# Patient Record
Sex: FEMALE | Race: WHITE | NOT HISPANIC OR LATINO
[De-identification: names, ages, dates, MRNs, and addresses within clinical notes are randomized per-mention and may not be internally consistent; named-entity substitution may affect disease eponyms.]

---

## 2023-10-08 ENCOUNTER — TRANSCRIPTION ENCOUNTER (OUTPATIENT)
Age: 88
End: 2023-10-08

## 2023-10-08 ENCOUNTER — INPATIENT (INPATIENT)
Facility: HOSPITAL | Age: 88
LOS: 2 days | Discharge: NURSING, REHAB OR LONG-TERM CARE | DRG: 535 | End: 2023-10-11
Attending: STUDENT IN AN ORGANIZED HEALTH CARE EDUCATION/TRAINING PROGRAM | Admitting: HOSPITALIST
Payer: MEDICARE

## 2023-10-08 VITALS
HEIGHT: 64 IN | OXYGEN SATURATION: 97 % | HEART RATE: 68 BPM | SYSTOLIC BLOOD PRESSURE: 121 MMHG | TEMPERATURE: 99 F | WEIGHT: 110.01 LBS | RESPIRATION RATE: 20 BRPM | DIASTOLIC BLOOD PRESSURE: 57 MMHG

## 2023-10-08 DIAGNOSIS — S32.82XA MULTIPLE FRACTURES OF PELVIS WITHOUT DISRUPTION OF PELVIC RING, INITIAL ENCOUNTER FOR CLOSED FRACTURE: ICD-10-CM

## 2023-10-08 LAB
ALBUMIN SERPL ELPH-MCNC: 3.9 G/DL — SIGNIFICANT CHANGE UP (ref 3.3–5.2)
ALP SERPL-CCNC: 185 U/L — HIGH (ref 40–120)
ALT FLD-CCNC: 76 U/L — HIGH
ANION GAP SERPL CALC-SCNC: 15 MMOL/L — SIGNIFICANT CHANGE UP (ref 5–17)
APPEARANCE UR: CLEAR — SIGNIFICANT CHANGE UP
AST SERPL-CCNC: 52 U/L — HIGH
BACTERIA # UR AUTO: ABNORMAL
BASOPHILS # BLD AUTO: 0.04 K/UL — SIGNIFICANT CHANGE UP (ref 0–0.2)
BASOPHILS NFR BLD AUTO: 0.3 % — SIGNIFICANT CHANGE UP (ref 0–2)
BILIRUB SERPL-MCNC: 0.4 MG/DL — SIGNIFICANT CHANGE UP (ref 0.4–2)
BILIRUB UR-MCNC: NEGATIVE — SIGNIFICANT CHANGE UP
BUN SERPL-MCNC: 52 MG/DL — HIGH (ref 8–20)
CALCIUM SERPL-MCNC: 8.5 MG/DL — SIGNIFICANT CHANGE UP (ref 8.4–10.5)
CHLORIDE SERPL-SCNC: 108 MMOL/L — SIGNIFICANT CHANGE UP (ref 96–108)
CK SERPL-CCNC: 104 U/L — SIGNIFICANT CHANGE UP (ref 25–170)
CO2 SERPL-SCNC: 15 MMOL/L — LOW (ref 22–29)
COLOR SPEC: YELLOW — SIGNIFICANT CHANGE UP
CREAT SERPL-MCNC: 2.22 MG/DL — HIGH (ref 0.5–1.3)
DIFF PNL FLD: ABNORMAL
EGFR: 20 ML/MIN/1.73M2 — LOW
EOSINOPHIL # BLD AUTO: 0.03 K/UL — SIGNIFICANT CHANGE UP (ref 0–0.5)
EOSINOPHIL NFR BLD AUTO: 0.2 % — SIGNIFICANT CHANGE UP (ref 0–6)
EPI CELLS # UR: NEGATIVE — SIGNIFICANT CHANGE UP
GLUCOSE SERPL-MCNC: 119 MG/DL — HIGH (ref 70–99)
GLUCOSE UR QL: NEGATIVE MG/DL — SIGNIFICANT CHANGE UP
HCT VFR BLD CALC: 29.8 % — LOW (ref 34.5–45)
HGB BLD-MCNC: 9.5 G/DL — LOW (ref 11.5–15.5)
IMM GRANULOCYTES NFR BLD AUTO: 0.7 % — SIGNIFICANT CHANGE UP (ref 0–0.9)
KETONES UR-MCNC: NEGATIVE — SIGNIFICANT CHANGE UP
LEUKOCYTE ESTERASE UR-ACNC: ABNORMAL
LYMPHOCYTES # BLD AUTO: 0.59 K/UL — LOW (ref 1–3.3)
LYMPHOCYTES # BLD AUTO: 4.7 % — LOW (ref 13–44)
MCHC RBC-ENTMCNC: 31.8 PG — SIGNIFICANT CHANGE UP (ref 27–34)
MCHC RBC-ENTMCNC: 31.9 GM/DL — LOW (ref 32–36)
MCV RBC AUTO: 99.7 FL — SIGNIFICANT CHANGE UP (ref 80–100)
MONOCYTES # BLD AUTO: 1.09 K/UL — HIGH (ref 0–0.9)
MONOCYTES NFR BLD AUTO: 8.7 % — SIGNIFICANT CHANGE UP (ref 2–14)
NEUTROPHILS # BLD AUTO: 10.63 K/UL — HIGH (ref 1.8–7.4)
NEUTROPHILS NFR BLD AUTO: 85.4 % — HIGH (ref 43–77)
NITRITE UR-MCNC: NEGATIVE — SIGNIFICANT CHANGE UP
PH UR: 5 — SIGNIFICANT CHANGE UP (ref 5–8)
PLATELET # BLD AUTO: 226 K/UL — SIGNIFICANT CHANGE UP (ref 150–400)
POTASSIUM SERPL-MCNC: 5.4 MMOL/L — HIGH (ref 3.5–5.3)
POTASSIUM SERPL-SCNC: 5.4 MMOL/L — HIGH (ref 3.5–5.3)
PROT SERPL-MCNC: 6.4 G/DL — LOW (ref 6.6–8.7)
PROT UR-MCNC: 30 MG/DL
RBC # BLD: 2.99 M/UL — LOW (ref 3.8–5.2)
RBC # FLD: 17.4 % — HIGH (ref 10.3–14.5)
RBC CASTS # UR COMP ASSIST: >50 /HPF (ref 0–4)
SODIUM SERPL-SCNC: 138 MMOL/L — SIGNIFICANT CHANGE UP (ref 135–145)
SP GR SPEC: 1.01 — SIGNIFICANT CHANGE UP (ref 1.01–1.02)
UROBILINOGEN FLD QL: NEGATIVE MG/DL — SIGNIFICANT CHANGE UP
WBC # BLD: 12.47 K/UL — HIGH (ref 3.8–10.5)
WBC # FLD AUTO: 12.47 K/UL — HIGH (ref 3.8–10.5)
WBC UR QL: SIGNIFICANT CHANGE UP /HPF (ref 0–5)

## 2023-10-08 PROCEDURE — 73502 X-RAY EXAM HIP UNI 2-3 VIEWS: CPT | Mod: 26,LT

## 2023-10-08 PROCEDURE — 72190 X-RAY EXAM OF PELVIS: CPT | Mod: 26,XS

## 2023-10-08 PROCEDURE — 99282 EMERGENCY DEPT VISIT SF MDM: CPT | Mod: GC

## 2023-10-08 PROCEDURE — 99232 SBSQ HOSP IP/OBS MODERATE 35: CPT

## 2023-10-08 PROCEDURE — 70450 CT HEAD/BRAIN W/O DYE: CPT | Mod: 26,MA

## 2023-10-08 PROCEDURE — 72125 CT NECK SPINE W/O DYE: CPT | Mod: 26,MA

## 2023-10-08 PROCEDURE — 74176 CT ABD & PELVIS W/O CONTRAST: CPT | Mod: 26,MA

## 2023-10-08 PROCEDURE — 71045 X-RAY EXAM CHEST 1 VIEW: CPT | Mod: 26

## 2023-10-08 PROCEDURE — 99285 EMERGENCY DEPT VISIT HI MDM: CPT | Mod: FS

## 2023-10-08 RX ORDER — SODIUM BICARBONATE 1 MEQ/ML
0.23 SYRINGE (ML) INTRAVENOUS
Qty: 75 | Refills: 0 | Status: DISCONTINUED | OUTPATIENT
Start: 2023-10-08 | End: 2023-10-09

## 2023-10-08 RX ORDER — SODIUM CHLORIDE 9 MG/ML
1000 INJECTION INTRAMUSCULAR; INTRAVENOUS; SUBCUTANEOUS ONCE
Refills: 0 | Status: COMPLETED | OUTPATIENT
Start: 2023-10-08 | End: 2023-10-08

## 2023-10-08 RX ORDER — HEPARIN SODIUM 5000 [USP'U]/ML
5000 INJECTION INTRAVENOUS; SUBCUTANEOUS EVERY 12 HOURS
Refills: 0 | Status: DISCONTINUED | OUTPATIENT
Start: 2023-10-08 | End: 2023-10-11

## 2023-10-08 RX ORDER — LOPERAMIDE HCL 2 MG
2 TABLET ORAL DAILY
Refills: 0 | Status: DISCONTINUED | OUTPATIENT
Start: 2023-10-08 | End: 2023-10-11

## 2023-10-08 RX ORDER — PSYLLIUM SEED (WITH DEXTROSE)
1 POWDER (GRAM) ORAL THREE TIMES A DAY
Refills: 0 | Status: DISCONTINUED | OUTPATIENT
Start: 2023-10-08 | End: 2023-10-11

## 2023-10-08 RX ORDER — MORPHINE SULFATE 50 MG/1
2 CAPSULE, EXTENDED RELEASE ORAL DAILY
Refills: 0 | Status: DISCONTINUED | OUTPATIENT
Start: 2023-10-08 | End: 2023-10-09

## 2023-10-08 RX ORDER — ACETAMINOPHEN 500 MG
650 TABLET ORAL ONCE
Refills: 0 | Status: COMPLETED | OUTPATIENT
Start: 2023-10-08 | End: 2023-10-08

## 2023-10-08 RX ADMIN — Medication 1 PACKET(S): at 22:21

## 2023-10-08 RX ADMIN — Medication 150 MEQ/KG/HR: at 22:18

## 2023-10-08 RX ADMIN — SODIUM CHLORIDE 1000 MILLILITER(S): 9 INJECTION INTRAMUSCULAR; INTRAVENOUS; SUBCUTANEOUS at 12:21

## 2023-10-08 RX ADMIN — Medication 650 MILLIGRAM(S): at 06:29

## 2023-10-08 RX ADMIN — Medication 2 MILLIGRAM(S): at 18:08

## 2023-10-08 RX ADMIN — HEPARIN SODIUM 5000 UNIT(S): 5000 INJECTION INTRAVENOUS; SUBCUTANEOUS at 18:08

## 2023-10-08 NOTE — PATIENT PROFILE ADULT - FALL HARM RISK - HARM RISK INTERVENTIONS

## 2023-10-08 NOTE — CONSULT NOTE ADULT - SUBJECTIVE AND OBJECTIVE BOX
89F with pmh HTN, AF on eliquis presenting s/p fall earlier today c/o left hip pain. Pt states she has been feeling dizzy when standing up quickly or bending down suddenly for past few weeks.  Pt states she was cleaning her shelves and bent down suddenly causing her to fall. Pt states she pulled the phone to herself and called her son for help. Son states she was likely down for 4hrs. Pt denies any other injury.  denies back pain, neck pain   Pt normally walks without assistance.  Denies numbness/tinglilng  Denies current dizziness, headache, vision changes, cp, sob, abdominal pain, n/v, dysuria      .REVIEW OF SYSTEMS      General: denies recent illness	  Skin/Breast: denies rash  Ophthalmologic: no blurry vision  Respiratory and Thorax: no difficulty breathing  Cardiovascular:	no CP  Gastrointestinal:	No abdominal pain  Musculoskeletal:	 see HPI  	    ROS is otherwise negative.  PAST MEDICAL & SURGICAL HISTORY:  HTN  A-Fib      Allergies: No Known Allergies      Medications:   Eliquis    FAMILY HISTORY:  : non-contributory    Social History: live at home alone    Ambulation: Walking independently [x ] With Cane [ ] With Walker [ ]  Bedbound [ ]                           9.5    12.47 )-----------( 226      ( 08 Oct 2023 05:53 )             29.8     10-08    138  |  108  |  52.0<H>  ----------------------------<  119<H>  5.4<H>   |  15.0<L>  |  2.22<H>    Ca    8.5      08 Oct 2023 05:53    TPro  6.4<L>  /  Alb  3.9  /  TBili  0.4  /  DBili  x   /  AST  52<H>  /  ALT  76<H>  /  AlkPhos  185<H>  10-08      PHYSICAL EXAM:    Vital Signs Last 24 Hrs  T(C): 36.7 (08 Oct 2023 12:42), Max: 37 (08 Oct 2023 01:33)  T(F): 98.1 (08 Oct 2023 12:42), Max: 98.6 (08 Oct 2023 01:33)  HR: 75 (08 Oct 2023 12:42) (68 - 75)  BP: 99/55 (08 Oct 2023 12:42) (99/55 - 121/57)  BP(mean): --  RR: 18 (08 Oct 2023 12:42) (18 - 20)  SpO2: 96% (08 Oct 2023 12:42) (96% - 98%)    Parameters below as of 08 Oct 2023 12:42  Patient On (Oxygen Delivery Method): room air      Daily Height in cm: 162.56 (08 Oct 2023 01:33)    Daily     Appearance: Alert, responsive, in no acute distress. lying in bed  +bruising noted to left side of forehead  no swelling, no bruising  Left LE no abrasions, no lacerations, no deformity  +tender left groin area, no anterior thigh tenderness  Pt able to flex left hip with pain  no tenderness right hip/ FROM right hip   calf soft NT  sensation intact, cap refill brisk    Imaging Studies:  INTERPRETATION:  CLINICAL INFORMATION: Fall, on blood thinners, acute   kidney injury    COMPARISON: None.    CONTRAST/COMPLICATIONS:  IV Contrast: NONE  Oral Contrast: NONE  Complications: None reported at time of study completion    PROCEDURE:  CT of the Abdomen and Pelvis was performed.  Sagittal and coronal reformats were performed.    FINDINGS:  LOWER CHEST: Mitral valvular/annular calcifications. Aortic valvular   calcifications. Coronary artery calcifications.    LIVER: Diffuse hyperdense liver attenuation, nonspecific, can be seen   with iron deposition in chronic amiodarone use. No focal lesion. No   cirrhotic morphology.  BILE DUCTS: Normal caliber.  GALLBLADDER: Within normal limits.  SPLEEN: Within normal limits.  PANCREAS: Within normal limits.  ADRENALS: Within normal limits.  KIDNEYS/URETERS: No renal stones or hydronephrosis. Bilateral renal cysts.    BLADDER:Within normal limits.  REPRODUCTIVE ORGANS: Uterus and adnexa within normal limits.    BOWEL: Colonic diverticulosis. No bowel obstruction. Appendix is not   visualized. No evidence of inflammation in the pericecal region.  PERITONEUM: No ascites.  VESSELS: Atherosclerotic changes.  RETROPERITONEUM/LYMPH NODES: No retroperitoneal hematoma. No   lymphadenopathy.  ABDOMINAL WALL: Within normal limits.  BONES: Acute nondisplaced fracture left posterior acetabulum, extends   into the joint space. Acute nondisplaced fracture left inferior and   superior pubic ramus. Scoliosis and spinal degenerative changes.    IMPRESSION:  Acute mildly displaced fracture left inferior pubic ramus and   nondisplaced fracture left superior pubic ramus. Acute nondisplaced   fracture left posterior acetabulum, extends into the joint space. No hip   dislocation.  No hematoma.    A/P:  Pt is a  89y Female with left inferior/superior pubic ramus fx, nondisplaced fx left posterior acetabulum    PLAN:   * Pain control  * Pt being admitted to trauma team  * Dr. Ferrari to view imaging  89F with pmh HTN, AF on eliquis presenting s/p fall earlier today c/o left hip pain. Pt states she has been feeling dizzy when standing up quickly or bending down suddenly for past few weeks.  Pt states she was cleaning her shelves and bent down suddenly causing her to fall. Pt states she pulled the phone to herself and called her son for help. Son states she was likely down for 4hrs. Pt denies any other injury.  denies back pain, neck pain   Pt normally walks without assistance.  Denies numbness/tinglilng  Denies current dizziness, headache, vision changes, cp, sob, abdominal pain, n/v, dysuria      .REVIEW OF SYSTEMS      General: denies recent illness	  Skin/Breast: denies rash  Ophthalmologic: no blurry vision  Respiratory and Thorax: no difficulty breathing  Cardiovascular:	no CP  Gastrointestinal:	No abdominal pain  Musculoskeletal:	 see HPI  	    ROS is otherwise negative.  PAST MEDICAL & SURGICAL HISTORY:  HTN  A-Fib      Allergies: No Known Allergies      Medications:   Eliquis    FAMILY HISTORY:  : non-contributory    Social History: live at home alone    Ambulation: Walking independently [x ] With Cane [ ] With Walker [ ]  Bedbound [ ]                           9.5    12.47 )-----------( 226      ( 08 Oct 2023 05:53 )             29.8     10-08    138  |  108  |  52.0<H>  ----------------------------<  119<H>  5.4<H>   |  15.0<L>  |  2.22<H>    Ca    8.5      08 Oct 2023 05:53    TPro  6.4<L>  /  Alb  3.9  /  TBili  0.4  /  DBili  x   /  AST  52<H>  /  ALT  76<H>  /  AlkPhos  185<H>  10-08      PHYSICAL EXAM:    Vital Signs Last 24 Hrs  T(C): 36.7 (08 Oct 2023 12:42), Max: 37 (08 Oct 2023 01:33)  T(F): 98.1 (08 Oct 2023 12:42), Max: 98.6 (08 Oct 2023 01:33)  HR: 75 (08 Oct 2023 12:42) (68 - 75)  BP: 99/55 (08 Oct 2023 12:42) (99/55 - 121/57)  BP(mean): --  RR: 18 (08 Oct 2023 12:42) (18 - 20)  SpO2: 96% (08 Oct 2023 12:42) (96% - 98%)    Parameters below as of 08 Oct 2023 12:42  Patient On (Oxygen Delivery Method): room air      Daily Height in cm: 162.56 (08 Oct 2023 01:33)    Daily     Appearance: Alert, responsive, in no acute distress. lying in bed  +bruising noted to left side of forehead  no swelling, no bruising  Left LE no abrasions, no lacerations, no deformity  +tender left groin area, no anterior thigh tenderness  Pt able to flex left hip with pain  no tenderness right hip/ FROM right hip   calf soft NT  sensation intact, cap refill brisk    Imaging Studies:  INTERPRETATION:  CLINICAL INFORMATION: Fall, on blood thinners, acute   kidney injury    COMPARISON: None.    CONTRAST/COMPLICATIONS:  IV Contrast: NONE  Oral Contrast: NONE  Complications: None reported at time of study completion    PROCEDURE:  CT of the Abdomen and Pelvis was performed.  Sagittal and coronal reformats were performed.    FINDINGS:  LOWER CHEST: Mitral valvular/annular calcifications. Aortic valvular   calcifications. Coronary artery calcifications.    LIVER: Diffuse hyperdense liver attenuation, nonspecific, can be seen   with iron deposition in chronic amiodarone use. No focal lesion. No   cirrhotic morphology.  BILE DUCTS: Normal caliber.  GALLBLADDER: Within normal limits.  SPLEEN: Within normal limits.  PANCREAS: Within normal limits.  ADRENALS: Within normal limits.  KIDNEYS/URETERS: No renal stones or hydronephrosis. Bilateral renal cysts.    BLADDER:Within normal limits.  REPRODUCTIVE ORGANS: Uterus and adnexa within normal limits.    BOWEL: Colonic diverticulosis. No bowel obstruction. Appendix is not   visualized. No evidence of inflammation in the pericecal region.  PERITONEUM: No ascites.  VESSELS: Atherosclerotic changes.  RETROPERITONEUM/LYMPH NODES: No retroperitoneal hematoma. No   lymphadenopathy.  ABDOMINAL WALL: Within normal limits.  BONES: Acute nondisplaced fracture left posterior acetabulum, extends   into the joint space. Acute nondisplaced fracture left inferior and   superior pubic ramus. Scoliosis and spinal degenerative changes.    IMPRESSION:  Acute mildly displaced fracture left inferior pubic ramus and   nondisplaced fracture left superior pubic ramus. Acute nondisplaced   fracture left posterior acetabulum, extends into the joint space. No hip   dislocation.  No hematoma.    A/P:  Pt is a  89y Female with left inferior/superior pubic ramus fx, nondisplaced fx left posterior acetabulum    PLAN:   * Pain control  * Pt being admitted to trauma team  * TTWB LLE   * Discussed with Dr. Ferrari

## 2023-10-08 NOTE — H&P ADULT - HISTORY OF PRESENT ILLNESS
89y Female with PMH of HTN and Afib on Eliquis who felt dizzy this morning while cleaning her kitchen and fell. Had brief LOC but was able to call her son for help. States that shes been feeling dizzy after getting up and doing work for the past few weeks. Only complaint if left hip pain. Denies fever, chills, nausea, vomiting, chest pain, and sob. She lives by herself, drives, and one of her sons lives 30 min away from her.  Denies fever, chills, nausea, vomiting, chest pain, and sob. She lives by herself, drives, and one of her sons lives 30 min away from her.  In ED was found to have pelvic fracture - non operative management per otho- will need rehab   denied dysuria and frequency

## 2023-10-08 NOTE — CONSULT NOTE ADULT - SUBJECTIVE AND OBJECTIVE BOX
TRAUMA SURGERY CONSULT     HPI: 89y Female with PMH of HTN and Afib on Eliquis who felt dizzy this morning while cleaning her kitchen and fell. Had brief LOC but was able to call her son for help. States that shes been feeling dizzy after getting up and doing work for the past few weeks. Only complaint if left hip pain. Denies fever, chills, nausea, vomiting, chest pain, and sob. She lives by herself, drives, and one of her sons lives 30 min away from her.     ROS: 10-system review is otherwise negative except HPI above.      PAST MEDICAL & SURGICAL HISTORY:  HTN  Afib    FAMILY HISTORY:  Family history not pertinent as reviewed with the patient.    SOCIAL HISTORY:  Denies any toxic habits    ALLERGIES: NKA No Known Allergies    HOME MEDICATIONS:  Eliquis  --------------------------------------------------------------------------------------------    PHYSICAL EXAM:   General: NAD, Lying in bed comfortably  Neuro: A+Ox3  HEENT: EOMI, PERRLA, MMM. left temporal ecchymosis non tender to palpation.   Cardio: RRR  Resp: Non labored breathing on RA  GI/Abd: Soft, NT/ND, no rebound/guarding, no masses palpated  Vascular: All 4 extremities warm and well perfused.   Pelvis: stable  Musculoskeletal: All 4 extremities moving spontaneously, no limitations, no spinal tenderness. Right ankle ecchymosis non tender to palpation. left hip tenderness to palpation and ROM.   --------------------------------------------------------------------------------------------    LABS                 9.5    12.47  )----------(  226       ( 08 Oct 2023 05:53 )               29.8      138    |  108    |  52.0   ----------------------------<  119        ( 08 Oct 2023 05:53 )  5.4     |  15.0   |  2.22     Ca    8.5        ( 08 Oct 2023 05:53 )    TPro  6.4    /  Alb  3.9    /  TBili  0.4    /  DBili  x      /  AST  52     /  ALT  76     /  AlkPhos  185    ( 08 Oct 2023 05:53 )    LIVER FUNCTIONS - ( 08 Oct 2023 05:53 )  Alb: 3.9 g/dL / Pro: 6.4 g/dL / ALK PHOS: 185 U/L / ALT: 76 U/L / AST: 52 U/L / GGT: x               CAPILLARY BLOOD GLUCOSE    CARDIAC MARKERS ( 08 Oct 2023 05:53 )  x     / x     / 104 U/L / x     / x          Urinalysis Basic - ( 08 Oct 2023 05:53 )    Color: x / Appearance: x / SG: x / pH: x  Gluc: 119 mg/dL / Ketone: x  / Bili: x / Urobili: x   Blood: x / Protein: x / Nitrite: x   Leuk Esterase: x / RBC: x / WBC x   Sq Epi: x / Non Sq Epi: x / Bacteria: x  --------------------------------------------------------------------------------------------  IMAGING  < from: CT Abdomen and Pelvis No Cont (10.08.23 @ 09:26) >    ACC: [id number] EXAM:  CT ABDOMEN AND PELVIS   ORDERED BY: KRISTINE MERCER     PROCEDURE DATE:  10/08/2023          INTERPRETATION:  CLINICAL INFORMATION: Fall, on blood thinners, acute   kidney injury    COMPARISON: None.    CONTRAST/COMPLICATIONS:  IV Contrast: NONE  Oral Contrast: NONE  Complications: None reported at time of study completion    PROCEDURE:  CT of the Abdomen and Pelvis was performed.  Sagittal and coronal reformats were performed.    FINDINGS:  LOWER CHEST: Mitral valvular/annular calcifications. Aortic valvular   calcifications. Coronary artery calcifications.    LIVER: Diffuse hyperdense liver attenuation, nonspecific, can be seen   with iron deposition in chronic amiodarone use. No focal lesion. No   cirrhotic morphology.  BILE DUCTS: Normal caliber.  GALLBLADDER: Within normal limits.  SPLEEN: Within normal limits.  PANCREAS: Within normal limits.  ADRENALS: Within normal limits.  KIDNEYS/URETERS: No renal stones or hydronephrosis. Bilateral renal cysts.    BLADDER:Within normal limits.  REPRODUCTIVE ORGANS: Uterus and adnexa within normal limits.    BOWEL: Colonic diverticulosis. No bowel obstruction. Appendix is not   visualized. No evidence of inflammation in the pericecal region.  PERITONEUM: No ascites.  VESSELS: Atherosclerotic changes.  RETROPERITONEUM/LYMPH NODES: No retroperitoneal hematoma. No   lymphadenopathy.  ABDOMINAL WALL: Within normal limits.  BONES: Acute nondisplaced fracture left posterior acetabulum, extends   into the joint space. Acute nondisplaced fracture left inferior and   superior pubic ramus. Scoliosis and spinal degenerative changes.    IMPRESSION:  Acute mildly displaced fracture left inferior pubic ramus and   nondisplaced fracture left superior pubic ramus. Acute nondisplaced   fracture left posterior acetabulum, extends into the joint space. No hip   dislocation.  No hematoma.

## 2023-10-08 NOTE — H&P ADULT - NSHPPHYSICALEXAM_GEN_ALL_CORE
GENERAL: NAD cachectic, pleasant conversing - when asked about age- "don't tell me about hernández age states there is no such thing as HERNÁNDEZ AGE-   HEAD:  Atraumatic, Normocephalic  EYES: EOMI, , conjunctiva and sclera clear  ENMT: No tonsillar erythema, exudates, or enlargement; Moist mucous membranes,   NECK: Supple,   NERVOUS SYSTEM:  Alert & Oriented X3, Good concentration; in bed can Moves upper and lower extremities;   CHEST/LUNG: Clear to percussion bilaterally; No rales, rhonchi, wheezing, or rubs  HEART: Regular rate and rhythm; No murmurs, rubs, or gallops  ABDOMEN: Soft, Nontender, Nondistended; Bowel sounds present  EXTREMITIES:  2+ Peripheral Pulses, No clubbing, cyanosis, or edema  LYMPH: No lymphadenopathy noted  SKIN: No rashes or lesions

## 2023-10-08 NOTE — DISCHARGE NOTE PROVIDER - NSDCFUADDAPPT_GEN_ALL_CORE_FT
Ortho recs:  Left inf/sup pubic rami fx, left nondisplaced acetabular fx   pain control  Toe Touch WB Left LE  follow up with Dr. Ferrari in 2-3 weeks if needed Ortho recs:  Left inf/sup pubic rami fx, left nondisplaced acetabular fx   pain control  Toe Touch WB Left LE, full weightbearing on left leg will risk displacement of the fracture  follow up with Dr. Ferrari in 2-3 weeks for continued care and repeat xrays

## 2023-10-08 NOTE — CONSULT NOTE ADULT - ASSESSMENT
ASSESSMENT: Patient is a 89y old female s/p fall, likely orthostatic in nature, who sustained left superior/inferior pubic rami fracture and left acetabular fracture. Has BOBBY with acidosis.     PLAN:    - No Trauma surgical intervention required  - f/u Ortho for recs  - f/u Medicine for BOBBY  - Rec PT after ortho eval  - Please reconsult trauma surgery for any further questions  - Plan discussed with Attending, Dr. Garcia

## 2023-10-08 NOTE — DISCHARGE NOTE PROVIDER - HOSPITAL COURSE
89y Female with PMH of HTN and Afib on Eliquis who felt dizzy this morning while cleaning her kitchen and fell. Had brief LOC but was able to call her son for help. States that shes been feeling dizzy after getting up and doing work for the past few weeks. Only complaint if left hip pain.     In ED was found to have pelvic fracture - non operative management per otho- recommending rehab. Weight bearing status: Left Lower Extremity:  Toe Touch Weight Bearing    Patient's BP borderline low, likely 2/2 medications, cards consulted, agree with holding BP medications. Additionally, eliquis was d/c'd due to risk of bleed given recent fall. Patient had echo which showed severe MR and severe pHTN, moderate-severe TR to be managed medically without intervention.     Patient with chronic diarrhea, on metamucil and imodium. Increased imodium to twice daily, can increase to three times daily as needed.      89y Female with PMH of HTN and Afib on Eliquis who felt dizzy this morning while cleaning her kitchen and fell. Had brief LOC but was able to call her son for help. States that shes been feeling dizzy after getting up and doing work for the past few weeks. Only complaint if left hip pain.     In ED was found to have pelvic fracture - non operative management per otho- recommending rehab. Weight bearing status: Left Lower Extremity:  Toe Touch Weight Bearing. Patient to c/w ergocal once every 7 days and multivitamin.     Patient's BP borderline low, likely 2/2 medications, cards consulted, agree with holding BP medications. Additionally, eliquis was d/c'd due to risk of bleed given recent fall. Patient had echo which showed severe MR and severe pHTN, moderate-severe TR to be managed medically without intervention.     Patient with chronic diarrhea, on metamucil and imodium. Increased imodium to twice daily, can increase to three times daily as needed.

## 2023-10-08 NOTE — DISCHARGE NOTE PROVIDER - NSDCCPCAREPLAN_GEN_ALL_CORE_FT
PRINCIPAL DISCHARGE DIAGNOSIS  Diagnosis: Multiple pelvic fractures  Assessment and Plan of Treatment: c/w pain management   c/w rehab  c/w ergocalciferol q7d and calcium supplements  can consider dexa scan outpt      SECONDARY DISCHARGE DIAGNOSES  Diagnosis: BOBBY (acute kidney injury)  Assessment and Plan of Treatment: resolved    Diagnosis: Anemia  Assessment and Plan of Treatment: hgb stable  c/w vit d    Diagnosis: Chronic atrial fibrillation  Assessment and Plan of Treatment: d/c'd eliquis given risk of bleed in setting of falls  son aware and amenable to plan    Diagnosis: Hypertension  Assessment and Plan of Treatment: hold home po BP meds given pt was borderline/low BP on presentation and during admission; low BP likely contributed to lightheadedness/dizziness prior to fall

## 2023-10-08 NOTE — H&P ADULT - ASSESSMENT
89y Female with PMH of HTN and Afib on Eliquis who felt dizzy this morning while cleaning her kitchen and fell. Had brief LOC but was able to call her son for help. States that shes been feeling dizzy after getting up and doing work for the past few weeks. Only complaint if left hip pain. Denies fever, chills, nausea, vomiting, chest pain, and sob. She lives by herself, drives, and one of her sons lives 30 min away from her.      Left inf/sup pubic rami fx, left nondisplaced acetabular fx -with left hip pain  pain control Toe Touch WB Left LE- denied any pain   DVt PPX       Afib-outpatient is on Eliquis- on discharge  readdress with Dr Hernandez Ybarra-   continuation of Eliquis with advanced age and fall risk   rate controlled     Chronic Diarrhea  imodium and Metamucil    ARF with metabolic acidosis - IVF -  monitor renal indices   avoid nephrotoxins   elevated LFTs also dehydration trend    89y Female with PMH of HTN and Afib on Eliquis who felt dizzy this morning while cleaning her kitchen and fell. Had brief LOC but was able to call her son for help. States that shes been feeling dizzy after getting up and doing work for the past few weeks. Only complaint if left hip pain. Denies fever, chills, nausea, vomiting, chest pain, and sob. She lives by herself, drives, and one of her sons lives 30 min away from her.      Left inf/sup pubic rami fx, left nondisplaced acetabular fx -with left hip pain  pain control Toe Touch WB Left LE- denied any pain   DVt PPX       Afib-outpatient is on Eliquis- on discharge  readdress with Dr Hernandez Ybarra-   continuation of Eliquis with advanced age and fall risk   rate controlled     Chronic Diarrhea  imodium and Metamucil    ARF with metabolic acidosis - IVF -  monitor renal indices   avoid nephrotoxins   elevated LFTs also dehydration trend     DW son at bedside

## 2023-10-08 NOTE — ED PROVIDER NOTE - NS ED ATTENDING STATEMENT MOD
This was a shared visit with the MEMO. I reviewed and verified the documentation and independently performed the documented:

## 2023-10-08 NOTE — CONSULT NOTE ADULT - NS ATTEND AMEND GEN_ALL_CORE FT
Case reviewed and discussed with PA, physical exam and Xrays/CT scan reviewed.  The patient has non displaced acetabular fracture, likely variant of Posterior column/transverse pattern. Low energy mechanism fall.  Given patient age and comorbidities, surgery is not recommended.  Patient will be TTWB on her LLE, will see her in the office in 2 weeks for repeat xrays, will need crutches vs walker, dvt ppx per trauma team, Ortho stable for DC pending PT eval.

## 2023-10-08 NOTE — DISCHARGE NOTE PROVIDER - ATTENDING DISCHARGE PHYSICAL EXAMINATION:
GENERAL: NAD, thin elderly woman   HEENT: Atraumatic, Normocephalic, EOMI  NECK: Supple, trachea midline  LUNG: Clear to percussion bilaterally, no peripheral edema  CV: Regular rate and rhythm; No murmurs, rubs, or gallops  ABDOMEN: Soft, Nontender, Nondistended; Bowel sounds present  EXTREMITIES:  2+ Peripheral Pulses, No clubbing, cyanosis, or edema  SKIN: No rashes or lesions

## 2023-10-08 NOTE — CONSULT NOTE ADULT - ATTENDING COMMENTS
Above assessment noted.  The patient was seen and examined by myself with the surgical resident.  The patient is s/p fall at home.  She became dizzy after bending down to clean her cabinet.  She fell and states she didn't recall much after that.  She had no chest pain or shortness of breath.  She has only complaints of left hip and pelvic pain.  HEENT NC/AT PERRL EOMI no raccoon eyes, no devi signs, trachea midline, chest B/L air entry, abdomen is soft and non tender, pelvis is tender to palpation of the left hip and lower pelvic area, extremities without gross angulation or deformity.  Head and c-spine CT scan without acute traumatic injury, CT of the Chest/abd/pel reveals a left acetabular fracture, left superior and inferior pubic rami fracture.  No hematoma.  The patient is admitted to the medical service, ortho consultation should be obtained for fracture management.  The patient is without acute trauma surgical concerns.  Will follow along.

## 2023-10-08 NOTE — ED ADULT NURSE REASSESSMENT NOTE - NS ED NURSE REASSESS COMMENT FT1
Pt A&Ox4 resting in bed. Pt is responsive to voice, awake and calm. Vital signs stable. Awaiting orders from md.

## 2023-10-08 NOTE — ED PROVIDER NOTE - PHYSICAL EXAMINATION
Gen: No acute distress, non toxic, answering questions appropriately.   HEENT: Mucous membranes moist, uvula m/l, pink conjunctivae, EOMI  CV: RRR, nl s1/s2.  Resp: CTAB, normal rate and effort  GI: Abdomen soft, NT, ND. No rebound, no guarding  Neuro: A&O x 3, moving all 4 extremities. CNII-XII intact. 5/5 strength in extremities x 4. sensation intact.   MSK: Left hip FROM, no point tenderness. pulses 2+   Skin: bruise to left forehead.

## 2023-10-08 NOTE — DISCHARGE NOTE PROVIDER - NSFOLLOWUPCLINICS_GEN_ALL_ED_FT
[facility] - Primary Care  Primary Care  [address] [place] [address]  Phone: [phone]  Fax:     Cardiology at [place] ([facility])  Cardiology  [address], [address]  [place] [address]  Phone: [phone]  Fax:

## 2023-10-08 NOTE — ED ADULT NURSE REASSESSMENT NOTE - NS ED NURSE REASSESS COMMENT FT1
Patient resting in stretcher, a/ox3, with family at bedside. Patient remains in c-collar. Patient offers no complaints at this time. Patient safety maintained.

## 2023-10-08 NOTE — ED PROVIDER NOTE - OBJECTIVE STATEMENT
89F with pmh HTN, AF on eliquis presenting s/p fall. Pt states she has been feeling dizzy when standing up quickly or bending down suddenly for past few weeks. Per son, she was supposed to mention this to her cardiologist at the last appointment but forgot to mention. Pt states she was cleaning her shelves and bent down suddenly causing her to fall. Pt states she pulled the phone to herself and called her son for help. Son states she was likely down for 4hrs.   Denies current dizziness, headache, vision changes, cp, sob, abdominal pain, n/v, dysuria

## 2023-10-08 NOTE — ED PROVIDER NOTE - CARE PLAN
1 Principal Discharge DX:	Multiple pelvic fractures  Secondary Diagnosis:	BOBBY (acute kidney injury)  Secondary Diagnosis:	Metabolic acidosis

## 2023-10-08 NOTE — ED ADULT TRIAGE NOTE - CHIEF COMPLAINT QUOTE
pt BIBEMS s/p fall at home with + head strike, denies LOC states she was carrying heavy items and slipped and fell. pt endorses taking eliquis MD at bedside priority Ct called

## 2023-10-08 NOTE — ED ADULT NURSE NOTE - OBJECTIVE STATEMENT
Patient is a/ox3 presenting to ED due to an unwitnessed fall this evening. Patient states she remembers bending over to get something in the kitchen drawer and she accidently fell on hardwood floor. Patient states she was awake and alert on the floor for several hours due to being unable to reach a phone. Patient states hitting the left side of her head. Patient also complains of left hit pain on palpation and movement. Time of fall is unknown, length on floor is unknown. Patient states she is on ELIQUIS for A-fib. Patient denies visual changes, shortness of breath, headache, weakness. Patient denies unilateral weakness. Patient PERRL is within normal limits. Patient walks without assistance and lives at home alone. Patient in c-collar. Patient uses hearing aides but does not have them with her.

## 2023-10-08 NOTE — ED ADULT NURSE NOTE - NSFALLHARMRISKINTERV_ED_ALL_ED
Assistance OOB with selected safe patient handling equipment if applicable/Communicate risk of Fall with Harm to all staff, patient, and family/Provide visual cue: red socks, yellow wristband, yellow gown, etc/Reinforce activity limits and safety measures with patient and family/Bed in lowest position, wheels locked, appropriate side rails in place/Call bell, personal items and telephone in reach/Instruct patient to call for assistance before getting out of bed/chair/stretcher/Non-slip footwear applied when patient is off stretcher/[place] to call system/Physically safe environment - no spills, clutter or unnecessary equipment/Purposeful Proactive Rounding/Room/bathroom lighting operational, light cord in reach

## 2023-10-08 NOTE — ED PROVIDER NOTE - CLINICAL SUMMARY MEDICAL DECISION MAKING FREE TEXT BOX
89F s/p fall after bending down. Bruise to left forehead, no lac/bleeding. FROM left hip, no point ttp.   CT head and c spine neg. XR left hip, labs, reassess. 89F s/p fall after bending down. Bruise to left forehead, no lac/bleeding. FROM left hip, no point ttp. CT head and c spine neg. XR left hip, labs, reassess.  Pt feeling some improvement, hesitant to ambulate. CT pelvis pending.

## 2023-10-08 NOTE — ED PROVIDER NOTE - PROGRESS NOTE DETAILS
Solomon: reassessed, looks well, c/o left hip/pelvic pain; labs and imaging notedl; admit to medicine for BOBBY and met acidosis; trauma and ortho consults appreciated

## 2023-10-09 LAB
ALBUMIN SERPL ELPH-MCNC: 3 G/DL — LOW (ref 3.3–5.2)
ALP SERPL-CCNC: 159 U/L — HIGH (ref 40–120)
ALT FLD-CCNC: 65 U/L — HIGH
ANION GAP SERPL CALC-SCNC: 13 MMOL/L — SIGNIFICANT CHANGE UP (ref 5–17)
AST SERPL-CCNC: 47 U/L — HIGH
BILIRUB SERPL-MCNC: 0.3 MG/DL — LOW (ref 0.4–2)
BUN SERPL-MCNC: 39.5 MG/DL — HIGH (ref 8–20)
CALCIUM SERPL-MCNC: 7.7 MG/DL — LOW (ref 8.4–10.5)
CHLORIDE SERPL-SCNC: 112 MMOL/L — HIGH (ref 96–108)
CO2 SERPL-SCNC: 16 MMOL/L — LOW (ref 22–29)
CREAT SERPL-MCNC: 1.58 MG/DL — HIGH (ref 0.5–1.3)
EGFR: 30 ML/MIN/1.73M2 — LOW
GLUCOSE SERPL-MCNC: 88 MG/DL — SIGNIFICANT CHANGE UP (ref 70–99)
HCT VFR BLD CALC: 26.5 % — LOW (ref 34.5–45)
HGB BLD-MCNC: 8.4 G/DL — LOW (ref 11.5–15.5)
MCHC RBC-ENTMCNC: 31.3 PG — SIGNIFICANT CHANGE UP (ref 27–34)
MCHC RBC-ENTMCNC: 31.7 GM/DL — LOW (ref 32–36)
MCV RBC AUTO: 98.9 FL — SIGNIFICANT CHANGE UP (ref 80–100)
PLATELET # BLD AUTO: 196 K/UL — SIGNIFICANT CHANGE UP (ref 150–400)
POTASSIUM SERPL-MCNC: 4.8 MMOL/L — SIGNIFICANT CHANGE UP (ref 3.5–5.3)
POTASSIUM SERPL-SCNC: 4.8 MMOL/L — SIGNIFICANT CHANGE UP (ref 3.5–5.3)
PROT SERPL-MCNC: 5.4 G/DL — LOW (ref 6.6–8.7)
RBC # BLD: 2.68 M/UL — LOW (ref 3.8–5.2)
RBC # FLD: 17.5 % — HIGH (ref 10.3–14.5)
SODIUM SERPL-SCNC: 141 MMOL/L — SIGNIFICANT CHANGE UP (ref 135–145)
WBC # BLD: 8.09 K/UL — SIGNIFICANT CHANGE UP (ref 3.8–10.5)
WBC # FLD AUTO: 8.09 K/UL — SIGNIFICANT CHANGE UP (ref 3.8–10.5)

## 2023-10-09 PROCEDURE — 76775 US EXAM ABDO BACK WALL LIM: CPT | Mod: 26

## 2023-10-09 PROCEDURE — 99232 SBSQ HOSP IP/OBS MODERATE 35: CPT

## 2023-10-09 RX ORDER — SODIUM BICARBONATE 1 MEQ/ML
325 SYRINGE (ML) INTRAVENOUS THREE TIMES A DAY
Refills: 0 | Status: COMPLETED | OUTPATIENT
Start: 2023-10-09 | End: 2023-10-11

## 2023-10-09 RX ORDER — SODIUM CHLORIDE 9 MG/ML
1000 INJECTION INTRAMUSCULAR; INTRAVENOUS; SUBCUTANEOUS
Refills: 0 | Status: DISCONTINUED | OUTPATIENT
Start: 2023-10-09 | End: 2023-10-11

## 2023-10-09 RX ORDER — ASPIRIN/CALCIUM CARB/MAGNESIUM 324 MG
81 TABLET ORAL DAILY
Refills: 0 | Status: DISCONTINUED | OUTPATIENT
Start: 2023-10-09 | End: 2023-10-11

## 2023-10-09 RX ORDER — ACETAMINOPHEN 500 MG
650 TABLET ORAL EVERY 6 HOURS
Refills: 0 | Status: DISCONTINUED | OUTPATIENT
Start: 2023-10-09 | End: 2023-10-11

## 2023-10-09 RX ADMIN — Medication 1 PACKET(S): at 14:22

## 2023-10-09 RX ADMIN — Medication 325 MILLIGRAM(S): at 21:43

## 2023-10-09 RX ADMIN — Medication 81 MILLIGRAM(S): at 13:58

## 2023-10-09 RX ADMIN — SODIUM CHLORIDE 50 MILLILITER(S): 9 INJECTION INTRAMUSCULAR; INTRAVENOUS; SUBCUTANEOUS at 20:29

## 2023-10-09 RX ADMIN — HEPARIN SODIUM 5000 UNIT(S): 5000 INJECTION INTRAVENOUS; SUBCUTANEOUS at 18:50

## 2023-10-09 RX ADMIN — Medication 1 PACKET(S): at 06:34

## 2023-10-09 RX ADMIN — Medication 325 MILLIGRAM(S): at 13:57

## 2023-10-09 RX ADMIN — Medication 2 MILLIGRAM(S): at 12:22

## 2023-10-09 RX ADMIN — Medication 1 PACKET(S): at 21:43

## 2023-10-09 RX ADMIN — HEPARIN SODIUM 5000 UNIT(S): 5000 INJECTION INTRAVENOUS; SUBCUTANEOUS at 06:34

## 2023-10-09 NOTE — CONSULT NOTE ADULT - SUBJECTIVE AND OBJECTIVE BOX
[place] [facility]                                   [address] [place] [address]                                                      PHONE: [phone]                                                         FAX: [phone]  [email or web address]  ---------------------------------------------------------------------------------------------------------------------------------    Reason for Consult: afib    HPI:  MIC FERRARI is an 89y Female with paroxysmal afib on Eliquis, RBBB, HTN, HLD presented after a fall with left hip pain. She felt very dizzy and fell to the ground. She thinks she fainted. She denies chest pain or SOB.    PAST MEDICAL & SURGICAL HISTORY:      No Known Allergies      MEDICATIONS  (STANDING):  aspirin  chewable 81 milliGRAM(s) Oral daily  heparin   Injectable 5000 Unit(s) SubCutaneous every 12 hours  loperamide 2 milliGRAM(s) Oral daily  psyllium Powder 1 Packet(s) Oral three times a day  sodium bicarbonate 325 milliGRAM(s) Oral three times a day    MEDICATIONS  (PRN):  acetaminophen     Tablet .. 650 milliGRAM(s) Oral every 6 hours PRN Temp greater or equal to 38C (100.4F), Mild Pain (1 - 3)      Social History:  Cigarettes: none    Family History:  denies CAD, MI, CVA, or sudden death.    ROS: Negative other than as mentioned in HPI.    Vital Signs Last 24 Hrs  T(C): 36.7 (09 Oct 2023 11:29), Max: 36.8 (09 Oct 2023 04:34)  T(F): 98 (09 Oct 2023 11:29), Max: 98.2 (09 Oct 2023 04:34)  HR: 75 (09 Oct 2023 11:29) (66 - 76)  BP: 94/52 (09 Oct 2023 11:29) (94/52 - 113/51)  BP(mean): --  RR: 19 (09 Oct 2023 11:29) (17 - 19)  SpO2: 98% (09 Oct 2023 11:29) (94% - 98%)    Parameters below as of 09 Oct 2023 11:29  Patient On (Oxygen Delivery Method): room air      ICU Vital Signs Last 24 Hrs  MIC FERRARI  I&O's Detail    08 Oct 2023 07:01  -  09 Oct 2023 07:00  --------------------------------------------------------  IN:  Total IN: 0 mL    OUT:    Voided (mL): 500 mL  Total OUT: 500 mL    Total NET: -500 mL        I&O's Summary    08 Oct 2023 07:01  -  09 Oct 2023 07:00  --------------------------------------------------------  IN: 0 mL / OUT: 500 mL / NET: -500 mL      Drug Dosing Weight  MIC FERRARI      PHYSICAL EXAM:  General: NAD  HEENT: Head; normocephalic, atraumatic.  Eyes: Pupils reactive, cornea wnl.  Neck: Supple, no nodes adenopathy, no NVD or carotid bruit or thyromegaly.  CARDIOVASCULAR: Normal S1 and S2, No murmur, rub, gallop or lift.   LUNGS: No rales, rhonchi or wheeze. Normal breath sounds bilaterally.  ABDOMEN: Soft, nontender without mass or organomegaly. bowel sounds normoactive.  EXTREMITIES: No clubbing, cyanosis or edema. Distal pulses wnl.   SKIN: warm and dry with normal turgor.  NEURO: Alert/oriented x 3  PSYCH: normal affect.        LABS:                        8.4    8.09  )-----------( 196      ( 09 Oct 2023 05:05 )             26.5     10-09    141  |  112<H>  |  39.5<H>  ----------------------------<  88  4.8   |  16.0<L>  |  1.58<H>    Ca    7.7<L>      09 Oct 2023 05:05    TPro  5.4<L>  /  Alb  3.0<L>  /  TBili  0.3<L>  /  DBili  x   /  AST  47<H>  /  ALT  65<H>  /  AlkPhos  159<H>  10-09    MIC FERRARI  CARDIAC MARKERS ( 08 Oct 2023 05:53 )  x     / x     / 104 U/L / x     / x            Urinalysis Basic - ( 09 Oct 2023 05:05 )    Color: x / Appearance: x / SG: x / pH: x  Gluc: 88 mg/dL / Ketone: x  / Bili: x / Urobili: x   Blood: x / Protein: x / Nitrite: x   Leuk Esterase: x / RBC: x / WBC x   Sq Epi: x / Non Sq Epi: x / Bacteria: x      ECG: atrial fibrillation 67 bpm, RBBB, LPFB    ECHO 2020  LVEF 60-65%.  Trace MR.    Assessment and Plan:  In summary, MIC FERRARI is an 89y Female with past medical history significant for paroxysmal afib on Eliquis, RBBB, HTN, HLD presented after a fall with left hip pain. She felt very dizzy and fell to the ground. She thinks she fainted. She denies chest pain or SOB.    - BP has been low. Continue to hold Metoprolol and Cardizem.  - check TTE to reevaluate for underlying structural heart disease  - continue off Eliquis -- she is a poor candidate for long term anticoagulation given her advanced age and fall risk  - supportive care for left hip fracture    Will follow with you.

## 2023-10-09 NOTE — PHYSICAL THERAPY INITIAL EVALUATION ADULT - ACTIVE RANGE OF MOTION EXAMINATION, REHAB EVAL
Left LE, Grossly 2/5 pain with AROM/bilateral upper extremity Active ROM was WFL (within functional limits)/Right LE Active ROM was WFL (within functional limits)/deficits as listed below

## 2023-10-09 NOTE — PHYSICAL THERAPY INITIAL EVALUATION ADULT - ADDITIONAL COMMENTS
Pt lives in a house with 6 steps to enter with  rails and 6  stairs inside with  rails.  Pt owns medical equipment: SAC, RW  Pt lives with: Alone   Someone is always available to provide assist.

## 2023-10-10 LAB
24R-OH-CALCIDIOL SERPL-MCNC: 25.4 NG/ML — LOW (ref 30–80)
ALBUMIN SERPL ELPH-MCNC: 2.9 G/DL — LOW (ref 3.3–5.2)
ALP SERPL-CCNC: 151 U/L — HIGH (ref 40–120)
ALT FLD-CCNC: 62 U/L — HIGH
ANION GAP SERPL CALC-SCNC: 11 MMOL/L — SIGNIFICANT CHANGE UP (ref 5–17)
AST SERPL-CCNC: 45 U/L — HIGH
BASOPHILS # BLD AUTO: 0.03 K/UL — SIGNIFICANT CHANGE UP (ref 0–0.2)
BASOPHILS NFR BLD AUTO: 0.4 % — SIGNIFICANT CHANGE UP (ref 0–2)
BILIRUB SERPL-MCNC: 0.4 MG/DL — SIGNIFICANT CHANGE UP (ref 0.4–2)
BLD GP AB SCN SERPL QL: SIGNIFICANT CHANGE UP
BUN SERPL-MCNC: 34.5 MG/DL — HIGH (ref 8–20)
CALCIUM SERPL-MCNC: 7.6 MG/DL — LOW (ref 8.4–10.5)
CHLORIDE SERPL-SCNC: 108 MMOL/L — SIGNIFICANT CHANGE UP (ref 96–108)
CO2 SERPL-SCNC: 16 MMOL/L — LOW (ref 22–29)
CREAT SERPL-MCNC: 1.41 MG/DL — HIGH (ref 0.5–1.3)
EGFR: 34 ML/MIN/1.73M2 — LOW
EOSINOPHIL # BLD AUTO: 0.2 K/UL — SIGNIFICANT CHANGE UP (ref 0–0.5)
EOSINOPHIL NFR BLD AUTO: 2.6 % — SIGNIFICANT CHANGE UP (ref 0–6)
FERRITIN SERPL-MCNC: 274 NG/ML — SIGNIFICANT CHANGE UP (ref 13–330)
GLUCOSE SERPL-MCNC: 80 MG/DL — SIGNIFICANT CHANGE UP (ref 70–99)
HCT VFR BLD CALC: 26.8 % — LOW (ref 34.5–45)
HGB BLD-MCNC: 8.6 G/DL — LOW (ref 11.5–15.5)
IMM GRANULOCYTES NFR BLD AUTO: 0.4 % — SIGNIFICANT CHANGE UP (ref 0–0.9)
IRON SATN MFR SERPL: 23 % — SIGNIFICANT CHANGE UP (ref 14–50)
IRON SATN MFR SERPL: 57 UG/DL — SIGNIFICANT CHANGE UP (ref 37–145)
LYMPHOCYTES # BLD AUTO: 1.03 K/UL — SIGNIFICANT CHANGE UP (ref 1–3.3)
LYMPHOCYTES # BLD AUTO: 13.5 % — SIGNIFICANT CHANGE UP (ref 13–44)
MAGNESIUM SERPL-MCNC: 1.8 MG/DL — SIGNIFICANT CHANGE UP (ref 1.6–2.6)
MCHC RBC-ENTMCNC: 31.6 PG — SIGNIFICANT CHANGE UP (ref 27–34)
MCHC RBC-ENTMCNC: 32.1 GM/DL — SIGNIFICANT CHANGE UP (ref 32–36)
MCV RBC AUTO: 98.5 FL — SIGNIFICANT CHANGE UP (ref 80–100)
MONOCYTES # BLD AUTO: 0.99 K/UL — HIGH (ref 0–0.9)
MONOCYTES NFR BLD AUTO: 13 % — SIGNIFICANT CHANGE UP (ref 2–14)
NEUTROPHILS # BLD AUTO: 5.35 K/UL — SIGNIFICANT CHANGE UP (ref 1.8–7.4)
NEUTROPHILS NFR BLD AUTO: 70.1 % — SIGNIFICANT CHANGE UP (ref 43–77)
PLATELET # BLD AUTO: 194 K/UL — SIGNIFICANT CHANGE UP (ref 150–400)
POTASSIUM SERPL-MCNC: 4.8 MMOL/L — SIGNIFICANT CHANGE UP (ref 3.5–5.3)
POTASSIUM SERPL-SCNC: 4.8 MMOL/L — SIGNIFICANT CHANGE UP (ref 3.5–5.3)
PROT SERPL-MCNC: 5.3 G/DL — LOW (ref 6.6–8.7)
RBC # BLD: 2.72 M/UL — LOW (ref 3.8–5.2)
RBC # BLD: 2.72 M/UL — LOW (ref 3.8–5.2)
RBC # FLD: 17.2 % — HIGH (ref 10.3–14.5)
RETICS #: 93.3 K/UL — SIGNIFICANT CHANGE UP (ref 25–125)
RETICS/RBC NFR: 3.4 % — HIGH (ref 0.5–2.5)
SODIUM SERPL-SCNC: 135 MMOL/L — SIGNIFICANT CHANGE UP (ref 135–145)
TIBC SERPL-MCNC: 246 UG/DL — SIGNIFICANT CHANGE UP (ref 220–430)
TRANSFERRIN SERPL-MCNC: 172 MG/DL — LOW (ref 192–382)
TSH SERPL-MCNC: 14.52 UIU/ML — HIGH (ref 0.27–4.2)
VIT B12 SERPL-MCNC: 275 PG/ML — SIGNIFICANT CHANGE UP (ref 232–1245)
WBC # BLD: 7.63 K/UL — SIGNIFICANT CHANGE UP (ref 3.8–10.5)
WBC # FLD AUTO: 7.63 K/UL — SIGNIFICANT CHANGE UP (ref 3.8–10.5)

## 2023-10-10 PROCEDURE — 93306 TTE W/DOPPLER COMPLETE: CPT | Mod: 26

## 2023-10-10 PROCEDURE — 99232 SBSQ HOSP IP/OBS MODERATE 35: CPT

## 2023-10-10 RX ORDER — ERGOCALCIFEROL 1.25 MG/1
50000 CAPSULE ORAL
Refills: 0 | Status: DISCONTINUED | OUTPATIENT
Start: 2023-10-10 | End: 2023-10-11

## 2023-10-10 RX ADMIN — Medication 325 MILLIGRAM(S): at 16:05

## 2023-10-10 RX ADMIN — HEPARIN SODIUM 5000 UNIT(S): 5000 INJECTION INTRAVENOUS; SUBCUTANEOUS at 05:54

## 2023-10-10 RX ADMIN — HEPARIN SODIUM 5000 UNIT(S): 5000 INJECTION INTRAVENOUS; SUBCUTANEOUS at 18:33

## 2023-10-10 RX ADMIN — Medication 325 MILLIGRAM(S): at 05:54

## 2023-10-10 RX ADMIN — Medication 81 MILLIGRAM(S): at 16:05

## 2023-10-10 RX ADMIN — Medication 2 MILLIGRAM(S): at 16:05

## 2023-10-10 RX ADMIN — Medication 1 PACKET(S): at 15:30

## 2023-10-10 RX ADMIN — Medication 1 PACKET(S): at 05:54

## 2023-10-10 NOTE — CHART NOTE - NSCHARTNOTEFT_GEN_A_CORE
Tertiary Trauma Survey (TTS)    Date of TTS: 10-10-23 @ 13:31                             Admit Date: 10-08-23 @ 12:18      Trauma Activation: Consult    List Injuries Identified to Date:  1. Left superior / inferior pubic rami fractures  2. Nondisplaced fracture left posterior acetabulum      List Operative and Interventional Radiological Procedures:   - None      Neuro: [x] non focal neurological exam [ ] Focal Neurological deficits noted to be:     HEENT: [ ] Normo-cephalic/atraumatic  [x] abnormalities noted to be: abrasion / ecchymosis to L forehead    Pulm/Chest:  [x] CTA b/l  [x] chest wall non tender  [ ] abnormalities noted to be:    Cardiac: [x] S1S2, sinus rhythm  [ ] abnormalities noted to be:     GI / Abdomen: [x] Soft, non-tender, non-distended [ ] abnormalities noted to be:    Musculoskeletal / Extremities: [x]normal active ROM  [ ]  abnormalities noted to be:    Integumentary: [x] Skin intact [x] Warm [x] Dry [ ]abnormalities noted to be: small ecchymosis to distal R shin, non-tender to palpation    Vascular: [x] 2+ palpable distal pulses  [ ] abnormalities noted to be:        RADIOLOGICAL FINDINGS REVIEW:  CT head: No acute intracranial hemorrhage or mass effect.    CT cervical spine: No CT evidence of cervical spine fracture.    CT abdomen & pelvis: Acute mildly displaced fracture left inferior pubic ramus and nondisplaced fracture left superior pubic ramus. Acute nondisplaced fracture left posterior acetabulum, extends into the joint space. No hip dislocation.  No hematoma.      INCIDENTAL FINDINGS:  [x] No    [x] Tertiary exam complete, there are no new injuries identified

## 2023-10-11 ENCOUNTER — TRANSCRIPTION ENCOUNTER (OUTPATIENT)
Age: 88
End: 2023-10-11

## 2023-10-11 VITALS — WEIGHT: 95.24 LBS

## 2023-10-11 LAB
-  AMIKACIN: SIGNIFICANT CHANGE UP
-  AMOXICILLIN/CLAVULANIC ACID: SIGNIFICANT CHANGE UP
-  AMPICILLIN/SULBACTAM: SIGNIFICANT CHANGE UP
-  AMPICILLIN: SIGNIFICANT CHANGE UP
-  AZTREONAM: SIGNIFICANT CHANGE UP
-  CEFAZOLIN: SIGNIFICANT CHANGE UP
-  CEFEPIME: SIGNIFICANT CHANGE UP
-  CEFOXITIN: SIGNIFICANT CHANGE UP
-  CEFTRIAXONE: SIGNIFICANT CHANGE UP
-  CEFUROXIME: SIGNIFICANT CHANGE UP
-  CIPROFLOXACIN: SIGNIFICANT CHANGE UP
-  ERTAPENEM: SIGNIFICANT CHANGE UP
-  GENTAMICIN: SIGNIFICANT CHANGE UP
-  IMIPENEM: SIGNIFICANT CHANGE UP
-  LEVOFLOXACIN: SIGNIFICANT CHANGE UP
-  MEROPENEM: SIGNIFICANT CHANGE UP
-  NITROFURANTOIN: SIGNIFICANT CHANGE UP
-  PIPERACILLIN/TAZOBACTAM: SIGNIFICANT CHANGE UP
-  TOBRAMYCIN: SIGNIFICANT CHANGE UP
-  TRIMETHOPRIM/SULFAMETHOXAZOLE: SIGNIFICANT CHANGE UP
ANION GAP SERPL CALC-SCNC: 11 MMOL/L — SIGNIFICANT CHANGE UP (ref 5–17)
BUN SERPL-MCNC: 26.8 MG/DL — HIGH (ref 8–20)
CALCIUM SERPL-MCNC: 7.9 MG/DL — LOW (ref 8.4–10.5)
CHLORIDE SERPL-SCNC: 106 MMOL/L — SIGNIFICANT CHANGE UP (ref 96–108)
CO2 SERPL-SCNC: 19 MMOL/L — LOW (ref 22–29)
CREAT SERPL-MCNC: 1.21 MG/DL — SIGNIFICANT CHANGE UP (ref 0.5–1.3)
CULTURE RESULTS: SIGNIFICANT CHANGE UP
EGFR: 41 ML/MIN/1.73M2 — LOW
GLUCOSE SERPL-MCNC: 101 MG/DL — HIGH (ref 70–99)
METHOD TYPE: SIGNIFICANT CHANGE UP
ORGANISM # SPEC MICROSCOPIC CNT: SIGNIFICANT CHANGE UP
ORGANISM # SPEC MICROSCOPIC CNT: SIGNIFICANT CHANGE UP
POTASSIUM SERPL-MCNC: 5 MMOL/L — SIGNIFICANT CHANGE UP (ref 3.5–5.3)
POTASSIUM SERPL-SCNC: 5 MMOL/L — SIGNIFICANT CHANGE UP (ref 3.5–5.3)
SODIUM SERPL-SCNC: 136 MMOL/L — SIGNIFICANT CHANGE UP (ref 135–145)
SPECIMEN SOURCE: SIGNIFICANT CHANGE UP
T4 AB SER-ACNC: 6.9 UG/DL — SIGNIFICANT CHANGE UP (ref 4.5–12)
TSH SERPL-MCNC: 13.26 UIU/ML — HIGH (ref 0.27–4.2)

## 2023-10-11 PROCEDURE — 99239 HOSP IP/OBS DSCHRG MGMT >30: CPT

## 2023-10-11 RX ORDER — PSYLLIUM SEED (WITH DEXTROSE)
1 POWDER (GRAM) ORAL
Qty: 0 | Refills: 0 | DISCHARGE
Start: 2023-10-11

## 2023-10-11 RX ORDER — ASPIRIN/CALCIUM CARB/MAGNESIUM 324 MG
1 TABLET ORAL
Qty: 0 | Refills: 0 | DISCHARGE
Start: 2023-10-11

## 2023-10-11 RX ORDER — LOPERAMIDE HCL 2 MG
1 TABLET ORAL
Qty: 0 | Refills: 0 | DISCHARGE
Start: 2023-10-11

## 2023-10-11 RX ORDER — LOPERAMIDE HCL 2 MG
2 TABLET ORAL EVERY 12 HOURS
Refills: 0 | Status: DISCONTINUED | OUTPATIENT
Start: 2023-10-11 | End: 2023-10-11

## 2023-10-11 RX ORDER — ACETAMINOPHEN 500 MG
2 TABLET ORAL
Qty: 0 | Refills: 0 | DISCHARGE
Start: 2023-10-11

## 2023-10-11 RX ORDER — INFLUENZA VIRUS VACCINE 15; 15; 15; 15 UG/.5ML; UG/.5ML; UG/.5ML; UG/.5ML
0.7 SUSPENSION INTRAMUSCULAR ONCE
Refills: 0 | Status: DISCONTINUED | OUTPATIENT
Start: 2023-10-11 | End: 2023-10-11

## 2023-10-11 RX ORDER — LOPERAMIDE HCL 2 MG
2 TABLET ORAL EVERY 6 HOURS
Refills: 0 | Status: DISCONTINUED | OUTPATIENT
Start: 2023-10-11 | End: 2023-10-11

## 2023-10-11 RX ORDER — ERGOCALCIFEROL 1.25 MG/1
50000 CAPSULE ORAL ONCE
Refills: 0 | Status: DISCONTINUED | OUTPATIENT
Start: 2023-10-11 | End: 2023-10-11

## 2023-10-11 RX ADMIN — HEPARIN SODIUM 5000 UNIT(S): 5000 INJECTION INTRAVENOUS; SUBCUTANEOUS at 06:09

## 2023-10-11 RX ADMIN — Medication 325 MILLIGRAM(S): at 06:09

## 2023-10-11 RX ADMIN — Medication 1 PACKET(S): at 06:09

## 2023-10-11 NOTE — DISCHARGE NOTE NURSING/CASE MANAGEMENT/SOCIAL WORK - PATIENT PORTAL LINK FT
You can access the FollowMyHealth Patient Portal offered by [facility] by registering at the following website: [email or web address]. By joining [employer]’s FollowMyHealth portal, you will also be able to view your health information using other applications (apps) compatible with our system.

## 2023-10-11 NOTE — DIETITIAN NUTRITION RISK NOTIFICATION - ADDITIONAL COMMENTS/DIETITIAN RECOMMENDATIONS
Continue diet as tolerated.   Add Ensure Plus High Protein TID (350 kcal, 20g protein per serving).  Continue vitamin D supplementation, add MVI and vitamin C 500mg daily. Encourage po intake, monitor diet tolerance, and provide assistance at meals as needed. Obtain daily weights to monitor trends.

## 2023-10-11 NOTE — DISCHARGE NOTE NURSING/CASE MANAGEMENT/SOCIAL WORK - NSDCFUADDAPPT_GEN_ALL_CORE_FT
Ortho recs:  Left inf/sup pubic rami fx, left nondisplaced acetabular fx   pain control  Toe Touch WB Left LE, full weightbearing on left leg will risk displacement of the fracture  follow up with Dr. Ferrari in 2-3 weeks for continued care and repeat xrays     Patient will be attending subacute rehabilitation at the below facility:    [facility] [place] [address]

## 2023-10-11 NOTE — DIETITIAN INITIAL EVALUATION ADULT - PERTINENT LABORATORY DATA
10-11    136  |  106  |  26.8<H>  ----------------------------<  101<H>  5.0   |  19.0<L>  |  1.21    Ca    7.9<L>      11 Oct 2023 06:39  Mg     1.8     10-10    TPro  5.3<L>  /  Alb  2.9<L>  /  TBili  0.4  /  DBili  x   /  AST  45<H>  /  ALT  62<H>  /  AlkPhos  151<H>  10-10

## 2023-10-11 NOTE — PROGRESS NOTE ADULT - REASON FOR ADMISSION
Left inf/sup pubic rami fx, left nondisplaced acetabular fx

## 2023-10-11 NOTE — DIETITIAN INITIAL EVALUATION ADULT - ADD RECOMMEND
Continue vitamin D supplementation, add MVI and vitamin C 500mg daily. Encourage po intake, monitor diet tolerance, and provide assistance at meals as needed. Obtain daily weights to monitor trends.

## 2023-10-11 NOTE — DIETITIAN INITIAL EVALUATION ADULT - OTHER INFO
89y Female with PMH of HTN and Afib on Eliquis who felt dizzy this morning while cleaning her kitchen and fell. Admitted with Left inf/sup pubic rami fx, left nondisplaced acetabular fx -with left hip pain.     Nutrition assessment completed. Pt reports persistent poor appetite/po intake. States she is not a big eater. PO intake his hospitalization has been minimal as she dislikes the food. Pt confirms she has lost weight over the last year (did not specify amount). Noted with stage I pressure injury to sacrum. Encouraged HBV protein sources. Pt receptive to receive Ensure. RD to follow up.

## 2023-10-11 NOTE — DIETITIAN INITIAL EVALUATION ADULT - ETIOLOGY
related to inability to meet sufficient protein-energy in setting of advanced age and lack of appetite

## 2023-10-11 NOTE — PROGRESS NOTE ADULT - SUBJECTIVE AND OBJECTIVE BOX
[facility], THE HEART CENTER                              [address] [place] [address]                                                 PHONE: [phone]                                                 FAX: [phone]  -----------------------------------------------------------------------------------------------  Pt seen and examined. FU for dizziness, low BP    Overnight events/Complaints: Pt without complains. BP better.    Vital Signs Last 24 Hrs  T(C): 36.8 (11 Oct 2023 04:32), Max: 36.8 (11 Oct 2023 04:32)  T(F): 98.2 (11 Oct 2023 04:32), Max: 98.2 (11 Oct 2023 04:32)  HR: 99 (11 Oct 2023 04:32) (86 - 100)  BP: 99/60 (11 Oct 2023 04:32) (99/60 - 114/68)  BP(mean): --  RR: 18 (11 Oct 2023 04:32) (17 - 19)  SpO2: 94% (11 Oct 2023 04:32) (92% - 94%)    Parameters below as of 11 Oct 2023 04:32  Patient On (Oxygen Delivery Method): room air      I&O's Summary    10 Oct 2023 07:01  -  11 Oct 2023 07:00  --------------------------------------------------------  IN: 0 mL / OUT: 1100 mL / NET: -1100 mL    RELEVENT PHYSICAL EXAM:  Neck: No obvious JVD  Cardiovascular: irregular S1, S2  Respiratory: Lungs clear to auscultation; no crepitations, no wheeze  Musculoskeletal: No edema        LABS:                        8.6    7.63  )-----------( 194      ( 10 Oct 2023 05:35 )             26.8     10-11    136  |  106  |  26.8<H>  ----------------------------<  101<H>  5.0   |  19.0<L>  |  1.21    Ca    7.9<L>      11 Oct 2023 06:39  Mg     1.8     10-10    TPro  5.3<L>  /  Alb  2.9<L>  /  TBili  0.4  /  DBili  x   /  AST  45<H>  /  ALT  62<H>  /  AlkPhos  151<H>  10-10      RADIOLOGY & ADDITIONAL STUDIES: (reviewed)  CXR was independently visualized/reviewed  and demonstrated: Fracture of the left acetabulum, superior and inferior pubic rami.    No evidence of acute cardiopulmonary disease.    CARDIOLOGY TESTING:(reviewed)     12 lead EKG independently visualized/reviewed  and demonstrated  atrial fibrillation 67 bpm, RBBB, LPFB    ECHO 2020  LVEF 60-65%.  Trace MR.    ECHO independently visualized/reviewed  and demonstrated:   Summary:   1. Normal left ventricular internal cavity size.   2. Normal global left ventricular systolic function.   3. Left ventricular ejection fraction, by visual estimation, is 60 to   65%.   4. The left ventricular diastolic function could not be assessed in this   study.   5. Severely enlarged left atrium.   6. Mildly enlarged right atrium.   7. Mild to moderate mitral annular calcification.   8. Thickening and calcification of the anterior and posterior mitral   valve leaflets.   9. Severe mitral valve regurgitation.  10.Moderate-severe tricuspid regurgitation.  11. Estimated pulmonary artery systolic pressure is 67.9 mmHg assuming a   right atrial pressure of 8 mmHg, which is consistent with severe   pulmonary hypertension.  12. There is no evidence of pericardial effusion.    MEDICATIONS:(reviewed)  MEDICATIONS  (STANDING):  aspirin  chewable 81 milliGRAM(s) Oral daily  ergocalciferol 70109 Unit(s) Oral every week  heparin   Injectable 5000 Unit(s) SubCutaneous every 12 hours  influenza  Vaccine (HIGH DOSE) 0.7 milliLiter(s) IntraMuscular once  loperamide 2 milliGRAM(s) Oral daily  psyllium Powder 1 Packet(s) Oral three times a day  sodium chloride 0.9%. 1000 milliLiter(s) (50 mL/Hr) IV Continuous <Continuous>    ASSESSMENT AND PLAN:    89y Female with past medical history significant for paroxysmal afib on Eliquis, RBBB, HTN, HLD presented after a fall with left hip pain. She felt very dizzy and fell to the ground. She thinks she fainted. She denies chest pain or SOB.    - Dizziness, hypotension related to meds  - Agree with discontinuing rate control meds at this time  - Off Eliquis - she is a poor candidate for long term anticoagulation given her advanced age and fall risk  - Echo with severe MR and severe pHTN, moderate-severe TR to be managed medically without intervention.   - supportive care for left hip fracture        
[facility] Division of Hospital Medicine    Chief Complaint:      INTERVAL HISTORY:  Overnight, no acute events.     Patient seen and examined at bedside this morning. Reports tolerating diet. Endorses some pain.     MEDICATIONS  (STANDING):  aspirin  chewable 81 milliGRAM(s) Oral daily  ergocalciferol 01053 Unit(s) Oral every week  heparin   Injectable 5000 Unit(s) SubCutaneous every 12 hours  influenza  Vaccine (HIGH DOSE) 0.7 milliLiter(s) IntraMuscular once  loperamide 2 milliGRAM(s) Oral daily  psyllium Powder 1 Packet(s) Oral three times a day  sodium chloride 0.9%. 1000 milliLiter(s) (50 mL/Hr) IV Continuous <Continuous>    MEDICATIONS  (PRN):  acetaminophen     Tablet .. 650 milliGRAM(s) Oral every 6 hours PRN Temp greater or equal to 38C (100.4F), Mild Pain (1 - 3)        I&O's Summary    10 Oct 2023 07:01  -  11 Oct 2023 07:00  --------------------------------------------------------  IN: 0 mL / OUT: 1100 mL / NET: -1100 mL        PHYSICAL EXAM:  Vital Signs Last 24 Hrs  T(C): 36.8 (11 Oct 2023 04:32), Max: 36.8 (11 Oct 2023 04:32)  T(F): 98.2 (11 Oct 2023 04:32), Max: 98.2 (11 Oct 2023 04:32)  HR: 99 (11 Oct 2023 04:32) (86 - 100)  BP: 99/60 (11 Oct 2023 04:32) (99/60 - 114/68)  BP(mean): --  RR: 18 (11 Oct 2023 04:32) (17 - 19)  SpO2: 94% (11 Oct 2023 04:32) (92% - 94%)    Parameters below as of 11 Oct 2023 04:32  Patient On (Oxygen Delivery Method): room air        GENERAL: NAD thin pleasant conversing   HEENT: Atraumatic, Normocephalic, EOMI  NECK: Supple, trachea midline  LUNG: Clear to percussion bilaterally, no peripheral edema  CV: Regular rate and rhythm; No murmurs, rubs, or gallops  ABDOMEN: Soft, Nontender, Nondistended; Bowel sounds present  EXTREMITIES:  2+ Peripheral Pulses, No clubbing, cyanosis, or edema  SKIN: No rashes or lesions    LABS:                        8.6    7.63  )-----------( 194      ( 10 Oct 2023 05:35 )             26.8     10-10    135  |  108  |  34.5<H>  ----------------------------<  80  4.8   |  16.0<L>  |  1.41<H>    Ca    7.6<L>      10 Oct 2023 05:35  Mg     1.8     10-10    TPro  5.3<L>  /  Alb  2.9<L>  /  TBili  0.4  /  DBili  x   /  AST  45<H>  /  ALT  62<H>  /  AlkPhos  151<H>  10-10          Urinalysis Basic - ( 10 Oct 2023 05:35 )    Color: x / Appearance: x / SG: x / pH: x  Gluc: 80 mg/dL / Ketone: x  / Bili: x / Urobili: x   Blood: x / Protein: x / Nitrite: x   Leuk Esterase: x / RBC: x / WBC x   Sq Epi: x / Non Sq Epi: x / Bacteria: x        Culture - Urine (collected 08 Oct 2023 15:12)  Source: Clean Catch Clean Catch (Midstream)  Preliminary Report (10 Oct 2023 12:44):    10,000 - 49,000 CFU/mL Escherichia coli      CAPILLARY BLOOD GLUCOSE            RADIOLOGY & ADDITIONAL TESTS:  Results Reviewed:   Imaging Personally Reviewed:  Electrocardiogram Personally Reviewed:  Summary:   1. Normal left ventricular internal cavity size.   2. Normal global left ventricular systolic function.   3. Left ventricular ejection fraction, by visual estimation, is 60 to   65%.   4. The left ventricular diastolic function could not be assessed in this   study.   5. Severely enlarged left atrium.   6. Mildly enlarged right atrium.   7. Mild to moderate mitral annular calcification.   8. Thickening and calcification of the anterior and posterior mitral   valve leaflets.   9. Severe mitral valve regurgitation.  10. Moderate-severe tricuspid regurgitation.  11. Estimated pulmonary artery systolic pressure is 67.9 mmHg assuming a   right atrial pressure of 8 mmHg, which is consistent with severe   pulmonary hypertension.  12. There is no evidence of pericardial effusion.                                        
[facility] Division of Hospital Medicine    Chief Complaint:      INTERVAL HISTORY:  Overnight, no acute events.     Patient seen and examined at bedside this morning. Reports eating all her breakfast, reports feeling much better than yesterday. Patient denies chest pain, SOB, abd pain, N/V, fever, chills, dysuria or any other complaints.     MEDICATIONS  (STANDING):  aspirin  chewable 81 milliGRAM(s) Oral daily  heparin   Injectable 5000 Unit(s) SubCutaneous every 12 hours  loperamide 2 milliGRAM(s) Oral daily  psyllium Powder 1 Packet(s) Oral three times a day  sodium bicarbonate 325 milliGRAM(s) Oral three times a day  sodium chloride 0.9%. 1000 milliLiter(s) (50 mL/Hr) IV Continuous <Continuous>    MEDICATIONS  (PRN):  acetaminophen     Tablet .. 650 milliGRAM(s) Oral every 6 hours PRN Temp greater or equal to 38C (100.4F), Mild Pain (1 - 3)        I&O's Summary    09 Oct 2023 07:01  -  10 Oct 2023 07:00  --------------------------------------------------------  IN: 0 mL / OUT: 1800 mL / NET: -1800 mL        PHYSICAL EXAM:  Vital Signs Last 24 Hrs  T(C): 36.4 (10 Oct 2023 11:34), Max: 36.7 (10 Oct 2023 04:10)  T(F): 97.6 (10 Oct 2023 11:34), Max: 98 (10 Oct 2023 04:10)  HR: 86 (10 Oct 2023 11:34) (70 - 95)  BP: 104/58 (10 Oct 2023 11:34) (88/49 - 113/55)  BP(mean): --  RR: 19 (10 Oct 2023 11:34) (18 - 19)  SpO2: 92% (10 Oct 2023 11:34) (92% - 96%)    Parameters below as of 10 Oct 2023 11:34  Patient On (Oxygen Delivery Method): room air        GENERAL: NAD thin pleasant conversing   HEENT: Atraumatic, Normocephalic, EOMI  NECK: Supple, trachea midline  LUNG: Clear to percussion bilaterally, no peripheral edema  CV: Regular rate and rhythm; No murmurs, rubs, or gallops  ABDOMEN: Soft, Nontender, Nondistended; Bowel sounds present  EXTREMITIES:  2+ Peripheral Pulses, No clubbing, cyanosis, or edema  SKIN: No rashes or lesions      LABS:                        8.6    7.63  )-----------( 194      ( 10 Oct 2023 05:35 )             26.8     10-10    135  |  108  |  34.5<H>  ----------------------------<  80  4.8   |  16.0<L>  |  1.41<H>    Ca    7.6<L>      10 Oct 2023 05:35  Mg     1.8     10-10    TPro  5.3<L>  /  Alb  2.9<L>  /  TBili  0.4  /  DBili  x   /  AST  45<H>  /  ALT  62<H>  /  AlkPhos  151<H>  10-10          Urinalysis Basic - ( 10 Oct 2023 05:35 )    Color: x / Appearance: x / SG: x / pH: x  Gluc: 80 mg/dL / Ketone: x  / Bili: x / Urobili: x   Blood: x / Protein: x / Nitrite: x   Leuk Esterase: x / RBC: x / WBC x   Sq Epi: x / Non Sq Epi: x / Bacteria: x        Culture - Urine (collected 08 Oct 2023 15:12)  Source: Clean Catch Clean Catch (Midstream)  Preliminary Report (10 Oct 2023 12:44):    10,000 - 49,000 CFU/mL Escherichia coli      CAPILLARY BLOOD GLUCOSE            RADIOLOGY & ADDITIONAL TESTS:  Results Reviewed:   Imaging Personally Reviewed:  Electrocardiogram Personally Reviewed:                                          
                                           [facility], [facility]                              [address] [place] [address]                                                 PHONE: [phone]                                                 FAX: [phone]  -----------------------------------------------------------------------------------------------  Pt seen and examined. FU for dizziness, low BP    Overnight events/Complaints: Pt without complains. BP better.    Vital Signs Last 24 Hrs  T(C): 36.7 (10 Oct 2023 04:10), Max: 36.7 (09 Oct 2023 11:29)  T(F): 98 (10 Oct 2023 04:10), Max: 98 (09 Oct 2023 11:29)  HR: 95 (10 Oct 2023 04:10) (70 - 95)  BP: 113/55 (10 Oct 2023 04:10) (88/49 - 113/55)  BP(mean): --  RR: 18 (10 Oct 2023 04:10) (18 - 19)  SpO2: 93% (10 Oct 2023 04:10) (93% - 98%)    Parameters below as of 10 Oct 2023 04:10  Patient On (Oxygen Delivery Method): room air      I&O's Summary    09 Oct 2023 07:01  -  10 Oct 2023 07:00  --------------------------------------------------------  IN: 0 mL / OUT: 1800 mL / NET: -1800 mL        RELEVENT PHYSICAL EXAM:  Neck: No obvious JVD  Cardiovascular: irregular S1, S2  Respiratory: Lungs clear to auscultation; no crepitations, no wheeze  Musculoskeletal: No edema        LABS:                        8.6    7.63  )-----------( 194      ( 10 Oct 2023 05:35 )             26.8     10-10    135  |  108  |  34.5<H>  ----------------------------<  80  4.8   |  16.0<L>  |  1.41<H>    Ca    7.6<L>      10 Oct 2023 05:35  Mg     1.8     10-10    TPro  5.3<L>  /  Alb  2.9<L>  /  TBili  0.4  /  DBili  x   /  AST  45<H>  /  ALT  62<H>  /  AlkPhos  151<H>  10-10      RADIOLOGY & ADDITIONAL STUDIES: (reviewed)  CXR was independently visualized/reviewed  and demonstrated: Fracture of the left acetabulum, superior and inferior pubic rami.    No evidence of acute cardiopulmonary disease.    CARDIOLOGY TESTING:(reviewed)     12 lead EKG independently visualized/reviewed  and demonstrated  atrial fibrillation 67 bpm, RBBB, LPFB    ECHO 2020  LVEF 60-65%.  Trace MR.    MEDICATIONS:(reviewed)  MEDICATIONS  (STANDING):  aspirin  chewable 81 milliGRAM(s) Oral daily  heparin   Injectable 5000 Unit(s) SubCutaneous every 12 hours  loperamide 2 milliGRAM(s) Oral daily  psyllium Powder 1 Packet(s) Oral three times a day  sodium bicarbonate 325 milliGRAM(s) Oral three times a day  sodium chloride 0.9%. 1000 milliLiter(s) (50 mL/Hr) IV Continuous <Continuous>      ASSESSMENT AND PLAN:    89y Female with past medical history significant for paroxysmal afib on Eliquis, RBBB, HTN, HLD presented after a fall with left hip pain. She felt very dizzy and fell to the ground. She thinks she fainted. She denies chest pain or SOB.    - Dizziness, hypotension related to meds  - Agree with discontinuing rate control meds at this time  - Off Eliquis - she is a poor candidate for long term anticoagulation given her advanced age and fall risk  - Pending TTE to reevaluate for underlying structural heart disease  - supportive care for left hip fracture        
MIC FERRARI    [id number]    89y      Female    INTERVAL HPI/OVERNIGHT EVENTS: patient being seen for pubis rami and acetabular fx.   patient states it only hurts when she tries to move    REVIEW OF SYSTEMS:    CONSTITUTIONAL: No fever, weight loss, or fatigue  RESPIRATORY: No cough, wheezing, hemoptysis; No shortness of breath  CARDIOVASCULAR: No chest pain, palpitations  GASTROINTESTINAL: No abdominal or epigastric pain. No nausea, vomiting  NEUROLOGICAL: No headaches, memory loss, loss of strength.  MISCELLANEOUS:      Vital Signs Last 24 Hrs  T(C): 36.8 (09 Oct 2023 04:34), Max: 36.8 (09 Oct 2023 04:34)  T(F): 98.2 (09 Oct 2023 04:34), Max: 98.2 (09 Oct 2023 04:34)  HR: 76 (09 Oct 2023 04:34) (66 - 76)  BP: 102/53 (09 Oct 2023 04:34) (99/55 - 113/51)  BP(mean): --  RR: 17 (09 Oct 2023 04:34) (17 - 19)  SpO2: 94% (09 Oct 2023 04:34) (94% - 98%)    Parameters below as of 09 Oct 2023 04:34  Patient On (Oxygen Delivery Method): room air        PHYSICAL EXAM:    GENERAL: NAD thin pleasant conversing   HEAD:  Atraumatic, Normocephalic  EYES: EOMI, , conjunctiva and sclera clear  ENMT: No tonsillar erythema, exudates, or enlargement; Moist mucous membranes,   NECK: Supple,   NERVOUS SYSTEM:  Alert & Oriented X3, Good concentration; in bed can Moves upper and lower extremities;   CHEST/LUNG: Clear to percussion bilaterally; No rales, rhonchi, wheezing, or rubs  HEART: Regular rate and rhythm; No murmurs, rubs, or gallops  ABDOMEN: Soft, Nontender, Nondistended; Bowel sounds present  EXTREMITIES:  2+ Peripheral Pulses, No clubbing, cyanosis, or edema  LYMPH: No lymphadenopathy noted  SKIN: No rashes or lesions    LABS:                        8.4    8.09  )-----------( 196      ( 09 Oct 2023 05:05 )             26.5     10-09    141  |  112<H>  |  39.5<H>  ----------------------------<  88  4.8   |  16.0<L>  |  1.58<H>    Ca    7.7<L>      09 Oct 2023 05:05    TPro  5.4<L>  /  Alb  3.0<L>  /  TBili  0.3<L>  /  DBili  x   /  AST  47<H>  /  ALT  65<H>  /  AlkPhos  159<H>  10-09      Urinalysis Basic - ( 09 Oct 2023 05:05 )    Color: x / Appearance: x / SG: x / pH: x  Gluc: 88 mg/dL / Ketone: x  / Bili: x / Urobili: x   Blood: x / Protein: x / Nitrite: x   Leuk Esterase: x / RBC: x / WBC x   Sq Epi: x / Non Sq Epi: x / Bacteria: x          MEDICATIONS  (STANDING):  aspirin  chewable 81 milliGRAM(s) Oral daily  heparin   Injectable 5000 Unit(s) SubCutaneous every 12 hours  loperamide 2 milliGRAM(s) Oral daily  psyllium Powder 1 Packet(s) Oral three times a day  sodium bicarbonate 325 milliGRAM(s) Oral three times a day    MEDICATIONS  (PRN):  acetaminophen     Tablet .. 650 milliGRAM(s) Oral every 6 hours PRN Temp greater or equal to 38C (100.4F), Mild Pain (1 - 3)      RADIOLOGY & ADDITIONAL TESTS:

## 2023-10-11 NOTE — DIETITIAN INITIAL EVALUATION ADULT - REASON FOR ADMISSION
Multiple fractures of pelvis without disruption of pelvic ring, initial encounter for closed fracture

## 2023-10-11 NOTE — DIETITIAN INITIAL EVALUATION ADULT - PERTINENT MEDS FT
MEDICATIONS  (STANDING):  ergocalciferol 55833 Unit(s) Oral once  multivitamin 1 Tablet(s) Oral daily  psyllium Powder 1 Packet(s) Oral three times a day  sodium chloride 0.9%. 1000 milliLiter(s) (50 mL/Hr) IV Continuous <Continuous>

## 2023-10-11 NOTE — DIETITIAN NUTRITION RISK NOTIFICATION - LOSS OF SUBCUTANEOUS FAT
Orbital.../Triceps.../Fat overlying ribcage.../Buccal... Quality 431: Preventive Care And Screening: Unhealthy Alcohol Use - Screening: Patient not identified as an unhealthy alcohol user when screened for unhealthy alcohol use using a systematic screening method Quality 110: Preventive Care And Screening: Influenza Immunization: Influenza Immunization not Administered for Documented Reasons. Quality 226: Preventive Care And Screening: Tobacco Use: Screening And Cessation Intervention: Patient screened for tobacco use and is an ex/non-smoker Detail Level: Detailed

## 2023-10-11 NOTE — PROGRESS NOTE ADULT - ASSESSMENT
89y Female with PMH of HTN and Afib on Eliquis who felt dizzy this morning while cleaning her kitchen and fell. Had brief LOC but was able to call her son for help. States that shes been feeling dizzy after getting up and doing work for the past few weeks. Only complaint if left hip pain. Denies fever, chills, nausea, vomiting, chest pain, and sob. She lives by herself, drives, and one of her sons lives 30 min away from her.      Left inf/sup pubic rami fx, left nondisplaced acetabular fx -with left hip pain  pain control Toe Touch WB Left LE- denied any pain   DVt PPX     Afib-outpatient is on Eliquis- on discharge  readdress with Dr Hernandez Ybarra-   hold eliquis for now  - will start low dose asa  not a good candidate for eliquis    ARF with metabolic acidosis - IVF -  improved  - hold fluids  - agma - minimal and improved  c,w po bicarb, dc fluids with bicarn  - renal us ordered    anemia - type and screen for am  - iron stuides for am   send tsh and vit b12 with fall  \  dispo - likely [facility] in 1-2 days      
89y Female with PMH of HTN and Afib on Eliquis who felt dizzy this morning while cleaning her kitchen and fell. Had brief LOC but was able to call her son for help. States that shes been feeling dizzy after getting up and doing work for the past few weeks. Only complaint if left hip pain. Denies fever, chills, nausea, vomiting, chest pain, and sob. She lives by herself, drives, and one of her sons lives 30 min away from her.    #Left inf/sup pubic rami fx, left nondisplaced acetabular fx -with left hip pain  - pain control Toe Touch WB Left LE- denied any pain   - DVT PPX  - Vit D low, will start ergocal q7d; ?outpt DEXA     #Afib-outpatient is on Eliquis  - cards consulted, agree w/ d/c eliquis given fall risk, hold all BP meds   - will start low dose asa  - F/u echo     #ARF with metabolic acidosis - improved  - s/p IVF   - agma - minimal and improved  - c/w po bicarb  - renal us ordered: No hydronephrosis bilaterally, evidence of bilateral medical renal disease..    #anemia - type and screen for am  - iron studies wnl   - tsh high 14.52, f/u T4 in am  - B12 wnl  - low vit D, will start ergocal     dispo: [facility] 
89y Female with PMH of HTN and Afib on Eliquis who felt dizzy this morning while cleaning her kitchen and fell. Had brief LOC but was able to call her son for help. States that shes been feeling dizzy after getting up and doing work for the past few weeks. Only complaint if left hip pain. Denies fever, chills, nausea, vomiting, chest pain, and sob. She lives by herself, drives, and one of her sons lives 30 min away from her.    #Left inf/sup pubic rami fx, left nondisplaced acetabular fx -with left hip pain  - pain control Toe Touch WB Left LE- denied any pain   - DVT PPX  - Vit D low, will start ergocal q7d; ?outpt DEXA     #Afib-outpatient is on Eliquis  - cards consulted, agree w/ d/c eliquis given fall risk, hold all BP meds   - will start low dose asa  - F/u echo     #ARF with metabolic acidosis - improved  - s/p IVF   - agma - minimal and improved  - c/w po bicarb  - renal us ordered: No hydronephrosis bilaterally, evidence of bilateral medical renal disease..    #anemia - type and screen for am  - iron studies wnl   - tsh high 14.52, f/u T4 in am  - B12 wnl  - low vit D, will start ergocal     dispo: [facility] after echo; likely tomorrow

## 2023-10-25 PROBLEM — Z00.00 ENCOUNTER FOR PREVENTIVE HEALTH EXAMINATION: Status: ACTIVE | Noted: 2023-10-25

## 2023-10-31 ENCOUNTER — APPOINTMENT (OUTPATIENT)
Dept: ORTHOPEDIC SURGERY | Facility: CLINIC | Age: 88
End: 2023-10-31
Payer: MEDICARE

## 2023-10-31 VITALS — WEIGHT: 78 LBS | BODY MASS INDEX: 14.35 KG/M2 | HEIGHT: 62 IN

## 2023-10-31 DIAGNOSIS — Z78.9 OTHER SPECIFIED HEALTH STATUS: ICD-10-CM

## 2023-10-31 DIAGNOSIS — Z86.79 PERSONAL HISTORY OF OTHER DISEASES OF THE CIRCULATORY SYSTEM: ICD-10-CM

## 2023-10-31 DIAGNOSIS — Z82.49 FAMILY HISTORY OF ISCHEMIC HEART DISEASE AND OTHER DISEASES OF THE CIRCULATORY SYSTEM: ICD-10-CM

## 2023-10-31 DIAGNOSIS — Z86.39 PERSONAL HISTORY OF OTHER ENDOCRINE, NUTRITIONAL AND METABOLIC DISEASE: ICD-10-CM

## 2023-10-31 PROCEDURE — 27220 TREAT HIP SOCKET FRACTURE: CPT | Mod: 58,LT

## 2023-10-31 PROCEDURE — 73502 X-RAY EXAM HIP UNI 2-3 VIEWS: CPT | Mod: 26

## 2023-10-31 PROCEDURE — 99024 POSTOP FOLLOW-UP VISIT: CPT

## 2023-10-31 PROCEDURE — 99214 OFFICE O/P EST MOD 30 MIN: CPT | Mod: 57

## 2023-10-31 RX ORDER — METOPROLOL TARTRATE 75 MG/1
TABLET, FILM COATED ORAL
Refills: 0 | Status: ACTIVE | COMMUNITY

## 2023-10-31 RX ORDER — ASPIRIN 325 MG/1
TABLET, FILM COATED ORAL
Refills: 0 | Status: ACTIVE | COMMUNITY

## 2023-10-31 RX ORDER — LEVOTHYROXINE SODIUM 25 UG/1
25 TABLET ORAL
Refills: 0 | Status: ACTIVE | COMMUNITY

## 2023-10-31 RX ORDER — LOPERAMIDE HCL 2 MG
TABLET ORAL
Refills: 0 | Status: ACTIVE | COMMUNITY

## 2023-11-13 PROCEDURE — 97163 PT EVAL HIGH COMPLEX 45 MIN: CPT

## 2023-11-13 PROCEDURE — 83540 ASSAY OF IRON: CPT

## 2023-11-13 PROCEDURE — 80048 BASIC METABOLIC PNL TOTAL CA: CPT

## 2023-11-13 PROCEDURE — 82728 ASSAY OF FERRITIN: CPT

## 2023-11-13 PROCEDURE — 73502 X-RAY EXAM HIP UNI 2-3 VIEWS: CPT

## 2023-11-13 PROCEDURE — 85025 COMPLETE CBC W/AUTO DIFF WBC: CPT

## 2023-11-13 PROCEDURE — 86901 BLOOD TYPING SEROLOGIC RH(D): CPT

## 2023-11-13 PROCEDURE — 83550 IRON BINDING TEST: CPT

## 2023-11-13 PROCEDURE — 84466 ASSAY OF TRANSFERRIN: CPT

## 2023-11-13 PROCEDURE — 93005 ELECTROCARDIOGRAM TRACING: CPT

## 2023-11-13 PROCEDURE — 72125 CT NECK SPINE W/O DYE: CPT | Mod: MA

## 2023-11-13 PROCEDURE — 82607 VITAMIN B-12: CPT

## 2023-11-13 PROCEDURE — 86900 BLOOD TYPING SEROLOGIC ABO: CPT

## 2023-11-13 PROCEDURE — 85045 AUTOMATED RETICULOCYTE COUNT: CPT

## 2023-11-13 PROCEDURE — 86850 RBC ANTIBODY SCREEN: CPT

## 2023-11-13 PROCEDURE — 70450 CT HEAD/BRAIN W/O DYE: CPT | Mod: MA

## 2023-11-13 PROCEDURE — 80053 COMPREHEN METABOLIC PANEL: CPT

## 2023-11-13 PROCEDURE — 93306 TTE W/DOPPLER COMPLETE: CPT

## 2023-11-13 PROCEDURE — 84436 ASSAY OF TOTAL THYROXINE: CPT

## 2023-11-13 PROCEDURE — 99285 EMERGENCY DEPT VISIT HI MDM: CPT

## 2023-11-13 PROCEDURE — 72190 X-RAY EXAM OF PELVIS: CPT

## 2023-11-13 PROCEDURE — 83735 ASSAY OF MAGNESIUM: CPT

## 2023-11-13 PROCEDURE — 82550 ASSAY OF CK (CPK): CPT

## 2023-11-13 PROCEDURE — 81001 URINALYSIS AUTO W/SCOPE: CPT

## 2023-11-13 PROCEDURE — 71045 X-RAY EXAM CHEST 1 VIEW: CPT

## 2023-11-13 PROCEDURE — 87186 SC STD MICRODIL/AGAR DIL: CPT

## 2023-11-13 PROCEDURE — 74176 CT ABD & PELVIS W/O CONTRAST: CPT | Mod: MA

## 2023-11-13 PROCEDURE — 85027 COMPLETE CBC AUTOMATED: CPT

## 2023-11-13 PROCEDURE — 76775 US EXAM ABDO BACK WALL LIM: CPT

## 2023-11-13 PROCEDURE — 82306 VITAMIN D 25 HYDROXY: CPT

## 2023-11-13 PROCEDURE — 36415 COLL VENOUS BLD VENIPUNCTURE: CPT

## 2023-11-13 PROCEDURE — 84443 ASSAY THYROID STIM HORMONE: CPT

## 2023-11-13 PROCEDURE — 87086 URINE CULTURE/COLONY COUNT: CPT

## 2023-11-30 ENCOUNTER — APPOINTMENT (OUTPATIENT)
Dept: ORTHOPEDIC SURGERY | Facility: CLINIC | Age: 88
End: 2023-11-30
Payer: MEDICARE

## 2023-11-30 VITALS — BODY MASS INDEX: 14.92 KG/M2 | WEIGHT: 76 LBS | HEIGHT: 60 IN | TEMPERATURE: 97.9 F

## 2023-11-30 DIAGNOSIS — S32.442A: ICD-10-CM

## 2023-11-30 PROCEDURE — 99024 POSTOP FOLLOW-UP VISIT: CPT

## 2024-09-30 NOTE — DIETITIAN INITIAL EVALUATION ADULT - MALNUTRITION
Blood pressure was elevated in the emergency department. Follow up with your PCP/outpatient providers, and return to the emergency department for new or worsening symptoms.   severe, chronic